# Patient Record
Sex: FEMALE | Race: WHITE | NOT HISPANIC OR LATINO | Employment: STUDENT | URBAN - METROPOLITAN AREA
[De-identification: names, ages, dates, MRNs, and addresses within clinical notes are randomized per-mention and may not be internally consistent; named-entity substitution may affect disease eponyms.]

---
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
## 2023-01-03 ENCOUNTER — LAB REQUISITION (OUTPATIENT)
Dept: LAB | Facility: CLINIC | Age: 18
End: 2023-01-03
Payer: COMMERCIAL

## 2023-01-03 PROCEDURE — 88304 TISSUE EXAM BY PATHOLOGIST: CPT | Mod: TC,ORL | Performed by: OTOLARYNGOLOGY

## 2023-01-04 LAB
PATH REPORT.COMMENTS IMP SPEC: NORMAL
PATH REPORT.COMMENTS IMP SPEC: NORMAL
PATH REPORT.FINAL DX SPEC: NORMAL
PATH REPORT.GROSS SPEC: NORMAL
PATH REPORT.MICROSCOPIC SPEC OTHER STN: NORMAL
PATH REPORT.RELEVANT HX SPEC: NORMAL
PHOTO IMAGE: NORMAL

## 2023-01-04 PROCEDURE — 88304 TISSUE EXAM BY PATHOLOGIST: CPT | Mod: 26 | Performed by: PATHOLOGY

## 2023-01-09 ENCOUNTER — DOCUMENTATION ONLY (OUTPATIENT)
Dept: OTHER | Facility: CLINIC | Age: 18
End: 2023-01-09

## 2023-04-19 ENCOUNTER — TELEPHONE (OUTPATIENT)
Dept: FAMILY MEDICINE | Facility: CLINIC | Age: 18
End: 2023-04-19
Payer: COMMERCIAL

## 2023-04-19 NOTE — TELEPHONE ENCOUNTER
[facility] in [facility] called trying to refill patients birth control.    New pharmacy will not pull into [facility] for refill. RN asked if pharmacists could send us a electronic request so that we could try and enter that into the system.      ARIA Jay  [facility]

## 2023-05-07 ENCOUNTER — HEALTH MAINTENANCE LETTER (OUTPATIENT)
Age: 18
End: 2023-05-07

## 2023-06-02 ENCOUNTER — OFFICE VISIT (OUTPATIENT)
Dept: FAMILY MEDICINE | Facility: CLINIC | Age: 18
End: 2023-06-02
Payer: COMMERCIAL

## 2023-06-02 VITALS
HEART RATE: 72 BPM | DIASTOLIC BLOOD PRESSURE: 69 MMHG | SYSTOLIC BLOOD PRESSURE: 115 MMHG | OXYGEN SATURATION: 100 % | BODY MASS INDEX: 19.72 KG/M2 | WEIGHT: 122.7 LBS | RESPIRATION RATE: 16 BRPM | HEIGHT: 66 IN

## 2023-06-02 DIAGNOSIS — Z13.220 SCREENING, LIPID: ICD-10-CM

## 2023-06-02 DIAGNOSIS — Z51.81 ENCOUNTER FOR THERAPEUTIC DRUG MONITORING: ICD-10-CM

## 2023-06-02 DIAGNOSIS — R11.15 NON-INTRACTABLE CYCLICAL VOMITING WITH NAUSEA: ICD-10-CM

## 2023-06-02 DIAGNOSIS — Z30.09 GENERAL COUNSELING FOR PRESCRIPTION OF ORAL CONTRACEPTIVES: ICD-10-CM

## 2023-06-02 DIAGNOSIS — R63.4 WEIGHT LOSS: ICD-10-CM

## 2023-06-02 DIAGNOSIS — Z00.00 ENCOUNTER FOR PREVENTIVE CARE: ICD-10-CM

## 2023-06-02 DIAGNOSIS — J45.20 MILD INTERMITTENT ASTHMA WITHOUT COMPLICATION: ICD-10-CM

## 2023-06-02 DIAGNOSIS — Z11.3 SCREENING FOR STDS (SEXUALLY TRANSMITTED DISEASES): Primary | ICD-10-CM

## 2023-06-02 DIAGNOSIS — J45.990 EXERCISE-INDUCED ASTHMA: ICD-10-CM

## 2023-06-02 PROBLEM — J45.909 MILD ASTHMA: Status: ACTIVE | Noted: 2018-06-26

## 2023-06-02 PROBLEM — J30.9 ALLERGIC RHINITIS DUE TO ALLERGEN: Status: RESOLVED | Noted: 2017-07-07 | Resolved: 2023-06-02

## 2023-06-02 PROBLEM — J35.1 SWOLLEN TONSIL: Status: RESOLVED | Noted: 2022-12-27 | Resolved: 2023-06-02

## 2023-06-02 PROCEDURE — 99395 PREV VISIT EST AGE 18-39: CPT | Performed by: FAMILY MEDICINE

## 2023-06-02 PROCEDURE — 99213 OFFICE O/P EST LOW 20 MIN: CPT | Mod: 25 | Performed by: FAMILY MEDICINE

## 2023-06-02 RX ORDER — NORGESTIMATE AND ETHINYL ESTRADIOL 0.25-0.035
1 KIT ORAL DAILY
Qty: 84 TABLET | Refills: 4 | Status: SHIPPED | OUTPATIENT
Start: 2023-06-02 | End: 2023-08-02

## 2023-06-02 RX ORDER — MONTELUKAST SODIUM 10 MG/1
10 TABLET ORAL AT BEDTIME
Qty: 90 TABLET | Refills: 3 | Status: SHIPPED | OUTPATIENT
Start: 2023-06-02 | End: 2024-06-11

## 2023-06-02 RX ORDER — ALBUTEROL SULFATE 90 UG/1
2 AEROSOL, METERED RESPIRATORY (INHALATION) EVERY 4 HOURS PRN
Qty: 18 G | Refills: 3 | Status: SHIPPED | OUTPATIENT
Start: 2023-06-02 | End: 2024-08-13

## 2023-06-02 RX ORDER — ONDANSETRON 4 MG/1
TABLET, ORALLY DISINTEGRATING ORAL
Qty: 20 TABLET | Refills: 3 | Status: SHIPPED | OUTPATIENT
Start: 2023-06-02 | End: 2024-08-13

## 2023-06-02 ASSESSMENT — ENCOUNTER SYMPTOMS
WEAKNESS: 0
DIZZINESS: 0
ARTHRALGIAS: 0
HEADACHES: 0
CONSTIPATION: 0
DIARRHEA: 0
PALPITATIONS: 0
NAUSEA: 0
COUGH: 0
HEMATOCHEZIA: 0
HEARTBURN: 0
EYE PAIN: 0
CHILLS: 0
DYSURIA: 0
FEVER: 0
JOINT SWELLING: 0
MYALGIAS: 0
ABDOMINAL PAIN: 0
FREQUENCY: 0
NERVOUS/ANXIOUS: 0
BREAST MASS: 0
HEMATURIA: 0
PARESTHESIAS: 0
SORE THROAT: 0
SHORTNESS OF BREATH: 0

## 2023-06-02 NOTE — ASSESSMENT & PLAN NOTE
Current bmi 19.96. weight has increased recently so will resolve this. She says she was running so that helped reduce her weight.

## 2023-06-02 NOTE — ASSESSMENT & PLAN NOTE
Use of oral contraceptive pills-she uses Heather and I will refill that today as it is working well for her.

## 2023-06-02 NOTE — ASSESSMENT & PLAN NOTE
Contraception - takes Heather.   Chart indicates she is due for her third COVID-vaccine   Pap: No indication until age 21  Mammo:  There is no family or personal history, not indicated    Colonoscopy:  There is no family or personal history, not indicated     Std testing desired:  offered  Osteoporosis prevention discussed.  vitamin d levels ordered. Recommend daily calcium and vitamin d intake to keep good bone health. Recommend weight bearing exercise, no tobacco, and limit alcohol  dexa - no indication.  Recommend sunscreen, exercise, & healthy diet.  Offered cbc, cmp, lipids and asked what other testing she  desires today  I have had an Advance Directives discussion with the patient.   There is no height or weight on file to calculate BMI.   mychart offered.

## 2023-06-02 NOTE — PROGRESS NOTES
Medication management addressed above and beyond usual scope of annual exam.      SUBJECTIVE:   CC: Letty is an 18 year old who presents for preventive health visit.       6/2/2023    12:55 PM   Additional Questions   Roomed by Tor Pereira MA   Accompanied by Self         6/2/2023    12:55 PM   Patient Reported Additional Medications   Patient reports taking the following new medications None     Healthy Habits:     Getting at least 3 servings of Calcium per day:  Yes    Bi-annual eye exam:  Yes    Dental care twice a year:  Yes    Sleep apnea or symptoms of sleep apnea:  None    Diet:  Regular (no restrictions)    Frequency of exercise:  4-5 days/week    Duration of exercise:  15-30 minutes    Taking medications regularly:  Yes    Medication side effects:  None    PHQ-2 Total Score: 0    Additional concerns today:  No      Today's PHQ-2 Score:       6/2/2023    12:48 PM   PHQ-2 ( 1999 [employer])   Q1: Little interest or pleasure in doing things 0   Q2: Feeling down, depressed or hopeless 0   PHQ-2 Score 0   Q1: Little interest or pleasure in doing things Not at all   Q2: Feeling down, depressed or hopeless Not at all   PHQ-2 Score 0           Social History     Tobacco Use     Smoking status: Never     Passive exposure: Never     Smokeless tobacco: Never   Vaping Use     Vaping status: Never Used     Passive vaping exposure: Yes   Substance Use Topics     Alcohol use: Not on file           6/2/2023    12:48 PM   Alcohol Use   Prescreen: >3 drinks/day or >7 drinks/week? No     Reviewed orders with patient.  Reviewed health maintenance and updated orders accordingly - Yes    Breast Cancer Screening:    History of abnormal Pap smear: NO - under age 21, PAP not appropriate for age     Reviewed and updated as needed this visit by clinical staff   Tobacco  Allergies  Meds  Problems  Med Hx  Surg Hx  Fam Hx  Soc   Hx        Reviewed and updated as needed this visit by Provider   Tobacco  Allergies  Meds  Problems   "Med Hx  Surg Hx  Fam Hx  Soc   Hx         Review of Systems   Constitutional: Negative for chills and fever.   HENT: Negative for congestion, ear pain, hearing loss and sore throat.    Eyes: Negative for pain and visual disturbance.   Respiratory: Negative for cough and shortness of breath.    Cardiovascular: Negative for chest pain, palpitations and peripheral edema.   Gastrointestinal: Negative for abdominal pain, constipation, diarrhea, heartburn, hematochezia and nausea.   Breasts:  Negative for tenderness, breast mass and discharge.   Genitourinary: Negative for dysuria, frequency, genital sores, hematuria, pelvic pain, urgency, vaginal bleeding and vaginal discharge.   Musculoskeletal: Negative for arthralgias, joint swelling and myalgias.   Skin: Negative for rash.   Neurological: Negative for dizziness, weakness, headaches and paresthesias.   Psychiatric/Behavioral: Negative for mood changes. The patient is not nervous/anxious.         OBJECTIVE:   /69 (BP Location: Left arm, Patient Position: Sitting, Cuff Size: Adult Regular)   Pulse 72   Resp 16   Ht 1.67 m (5' 5.75\")   Wt 55.7 kg (122 lb 11.2 oz)   LMP 05/25/2023   SpO2 100%   BMI 19.96 kg/m    Physical Exam  Constitutional:       Appearance: Normal appearance.   HENT:      Head: Normocephalic and atraumatic.      Right Ear: Tympanic membrane, ear canal and external ear normal.      Left Ear: Tympanic membrane, ear canal and external ear normal.      Nose: Nose normal.      Mouth/Throat:      Mouth: Mucous membranes are moist.      Pharynx: Oropharynx is clear.   Eyes:      Conjunctiva/sclera: Conjunctivae normal.      Pupils: Pupils are equal, round, and reactive to light.   Cardiovascular:      Rate and Rhythm: Normal rate and regular rhythm.      Heart sounds: Normal heart sounds.   Pulmonary:      Effort: Pulmonary effort is normal.      Breath sounds: Normal breath sounds.   Abdominal:      General: Bowel sounds are normal.      " Palpations: Abdomen is soft.   Musculoskeletal:         General: Normal range of motion.      Cervical back: Normal range of motion and neck supple.   Skin:     General: Skin is warm and dry.      Capillary Refill: Capillary refill takes less than 2 seconds.   Neurological:      General: No focal deficit present.      Mental Status: She is alert and oriented to person, place, and time.   Psychiatric:         Mood and Affect: Mood normal.         Behavior: Behavior normal.         Thought Content: Thought content normal.         Judgment: Judgment normal.           ASSESSMENT/PLAN:     Problem List Items Addressed This Visit        Respiratory    Mild asthma     Asthma   Triggers: allergies at the end of winter and allegra helps, but still needs albuterol daily so will add singulair to see if that reduces her need for albuterol.   She also is to take it prior to exercise.         Relevant Medications    albuterol (PROAIR HFA/PROVENTIL HFA/VENTOLIN HFA) 108 (90 Base) MCG/ACT inhaler    montelukast (SINGULAIR) 10 MG tablet       Digestive    Non-intractable cyclical vomiting with nausea     Cyclic vomiting syndrome - Menstrual related.  Takes Zofran mid-cycle.          Relevant Medications    ondansetron (ZOFRAN ODT) 4 MG ODT tab    norgestimate-ethinyl estradiol (LEISA) 0.25-35 MG-MCG tablet       Other    Encounter for preventive care     Contraception - takes Leisa.   Chart indicates she is due for her third COVID-vaccine   Pap: No indication until age 21  Mammo:  There is no family or personal history, not indicated    Colonoscopy:  There is no family or personal history, not indicated     Std testing desired:  offered  Osteoporosis prevention discussed.  vitamin d levels ordered. Recommend daily calcium and vitamin d intake to keep good bone health. Recommend weight bearing exercise, no tobacco, and limit alcohol  dexa - no indication.  Recommend sunscreen, exercise, & healthy diet.  Offered cbc, cmp, lipids and  asked what other testing she  desires today  I have had an Advance Directives discussion with the patient.   There is no height or weight on file to calculate BMI.   debby offered.            General counseling for prescription of oral contraceptives     Use of oral contraceptive pills-she uses [facility] and I will refill that today as it is working well for her.         RESOLVED: Weight loss     Current bmi 19.96. weight has increased recently so will resolve this. She says she was running so that helped reduce her weight.         Other Visit Diagnoses     Screening for STDs (sexually transmitted diseases)    -  Primary    Encounter for therapeutic drug monitoring        Relevant Orders    CBC with platelets    Comprehensive metabolic panel (BMP + Alb, Alk Phos, ALT, AST, Total. Bili, TP)    Screening, lipid        Relevant Orders    Lipid Profile          Patient has been advised of split billing requirements and indicates understanding: Yes      COUNSELING:  Reviewed preventive health counseling, as reflected in patient instructions       Regular exercise       Healthy diet/nutrition       Contraception       Safe sex practices/STD prevention       Advance Care Planning        She reports that she has never smoked. She has never been exposed to tobacco smoke. She has never used smokeless tobacco.      Mercy Romero MD  [facility] [place]

## 2023-06-02 NOTE — ASSESSMENT & PLAN NOTE
Asthma   Triggers: allergies at the end of winter and allegra helps, but still needs albuterol daily so will add singulair to see if that reduces her need for albuterol.   She also is to take it prior to exercise.

## 2023-08-02 RX ORDER — NORGESTIMATE AND ETHINYL ESTRADIOL 0.25-0.035
1 KIT ORAL DAILY
Qty: 84 TABLET | Refills: 4 | Status: SHIPPED | OUTPATIENT
Start: 2023-08-02 | End: 2024-08-13

## 2023-08-21 ENCOUNTER — MYC MEDICAL ADVICE (OUTPATIENT)
Dept: FAMILY MEDICINE | Facility: CLINIC | Age: 18
End: 2023-08-21
Payer: COMMERCIAL

## 2023-08-21 DIAGNOSIS — Z01.84 IMMUNITY STATUS TESTING: Primary | ICD-10-CM

## 2023-08-23 ENCOUNTER — LAB (OUTPATIENT)
Dept: LAB | Facility: CLINIC | Age: 18
End: 2023-08-23
Payer: COMMERCIAL

## 2023-08-23 DIAGNOSIS — Z01.84 IMMUNITY STATUS TESTING: ICD-10-CM

## 2023-08-23 DIAGNOSIS — Z51.81 ENCOUNTER FOR THERAPEUTIC DRUG MONITORING: ICD-10-CM

## 2023-08-23 DIAGNOSIS — Z13.220 SCREENING, LIPID: ICD-10-CM

## 2023-08-23 LAB
ALBUMIN SERPL BCG-MCNC: 4.4 G/DL (ref 3.5–5.2)
ALP SERPL-CCNC: 89 U/L (ref 35–104)
ALT SERPL W P-5'-P-CCNC: 10 U/L (ref 0–50)
ANION GAP SERPL CALCULATED.3IONS-SCNC: 11 MMOL/L (ref 7–15)
AST SERPL W P-5'-P-CCNC: 19 U/L (ref 0–45)
BILIRUB SERPL-MCNC: 0.3 MG/DL
BUN SERPL-MCNC: 11.4 MG/DL (ref 6–20)
CALCIUM SERPL-MCNC: 9.1 MG/DL (ref 8.6–10)
CHLORIDE SERPL-SCNC: 105 MMOL/L (ref 98–107)
CHOLEST SERPL-MCNC: 183 MG/DL
CREAT SERPL-MCNC: 0.8 MG/DL (ref 0.51–0.95)
DEPRECATED HCO3 PLAS-SCNC: 25 MMOL/L (ref 22–29)
ERYTHROCYTE [DISTWIDTH] IN BLOOD BY AUTOMATED COUNT: 14.8 % (ref 10–15)
GFR SERPL CREATININE-BSD FRML MDRD: >90 ML/MIN/1.73M2
GLUCOSE SERPL-MCNC: 90 MG/DL (ref 70–99)
HCT VFR BLD AUTO: 40.3 % (ref 35–47)
HDLC SERPL-MCNC: 56 MG/DL
HGB BLD-MCNC: 12.8 G/DL (ref 11.7–15.7)
LDLC SERPL CALC-MCNC: 102 MG/DL
MCH RBC QN AUTO: 25.4 PG (ref 26.5–33)
MCHC RBC AUTO-ENTMCNC: 31.8 G/DL (ref 31.5–36.5)
MCV RBC AUTO: 80 FL (ref 78–100)
NONHDLC SERPL-MCNC: 127 MG/DL
PLATELET # BLD AUTO: 303 10E3/UL (ref 150–450)
POTASSIUM SERPL-SCNC: 4.4 MMOL/L (ref 3.4–5.3)
PROT SERPL-MCNC: 7.6 G/DL (ref 6.4–8.3)
RBC # BLD AUTO: 5.04 10E6/UL (ref 3.8–5.2)
SODIUM SERPL-SCNC: 141 MMOL/L (ref 136–145)
TRIGL SERPL-MCNC: 125 MG/DL
WBC # BLD AUTO: 5.5 10E3/UL (ref 4–11)

## 2023-08-23 PROCEDURE — 86706 HEP B SURFACE ANTIBODY: CPT

## 2023-08-23 PROCEDURE — 80053 COMPREHEN METABOLIC PANEL: CPT

## 2023-08-23 PROCEDURE — 36415 COLL VENOUS BLD VENIPUNCTURE: CPT

## 2023-08-23 PROCEDURE — 85027 COMPLETE CBC AUTOMATED: CPT

## 2023-08-23 PROCEDURE — 80061 LIPID PANEL: CPT

## 2023-08-24 LAB
HBV SURFACE AB SERPL IA-ACNC: 454.32 M[IU]/ML
HBV SURFACE AB SERPL IA-ACNC: REACTIVE M[IU]/ML

## 2023-08-25 ENCOUNTER — MYC MEDICAL ADVICE (OUTPATIENT)
Dept: FAMILY MEDICINE | Facility: CLINIC | Age: 18
End: 2023-08-25
Payer: COMMERCIAL

## 2023-11-08 ENCOUNTER — E-VISIT (OUTPATIENT)
Dept: FAMILY MEDICINE | Facility: CLINIC | Age: 18
End: 2023-11-08
Payer: COMMERCIAL

## 2023-11-08 DIAGNOSIS — N39.0 ACUTE UTI (URINARY TRACT INFECTION): Primary | ICD-10-CM

## 2023-11-08 PROCEDURE — 99421 OL DIG E/M SVC 5-10 MIN: CPT | Performed by: FAMILY MEDICINE

## 2023-11-08 RX ORDER — NITROFURANTOIN 25; 75 MG/1; MG/1
100 CAPSULE ORAL 2 TIMES DAILY
Qty: 10 CAPSULE | Refills: 0 | Status: SHIPPED | OUTPATIENT
Start: 2023-11-08 | End: 2023-11-13

## 2023-11-08 NOTE — PATIENT INSTRUCTIONS
Dear Letty Agarwal    After reviewing your responses, I suspecte you have a urinary tract infection, which is a common infection of the bladder with bacteria.  This is not a sexually transmitted infection, though urinating immediately after intercourse can help prevent infections.  Drinking lots of fluids is also helpful to clear your current infection and prevent the next one.      I have sent a prescription for antibiotics to your pharmacy to treat this infection.        It is important that you take all of your prescribed medication even if your symptoms are improving after a few doses.  Taking all of your medicine helps prevent the symptoms from returning.     If you are not getting better with the antibiotic, more testing would be recommended and an in person visit to leave urine sample/ check for stds    If your symptoms worsen, you develop pain in your back or stomach, develop fevers, or are not improving in 5 days, please contact your primary care provider for an appointment or visit any of our convenient Walk-in or Urgent Care Centers to be seen, which can be found on our website here.    Thanks again for choosing us as your health care partner,    Mercy Romero MD

## 2023-12-27 DIAGNOSIS — E78.00 ELEVATED LDL CHOLESTEROL LEVEL: Primary | ICD-10-CM

## 2023-12-29 ENCOUNTER — LAB (OUTPATIENT)
Dept: LAB | Facility: CLINIC | Age: 18
End: 2023-12-29
Payer: COMMERCIAL

## 2023-12-29 DIAGNOSIS — E78.00 ELEVATED LDL CHOLESTEROL LEVEL: ICD-10-CM

## 2023-12-29 LAB
CHOLEST SERPL-MCNC: 184 MG/DL
FASTING STATUS PATIENT QL REPORTED: YES
HDLC SERPL-MCNC: 62 MG/DL
LDLC SERPL CALC-MCNC: 100 MG/DL
NONHDLC SERPL-MCNC: 122 MG/DL
TRIGL SERPL-MCNC: 109 MG/DL

## 2023-12-29 PROCEDURE — 80061 LIPID PANEL: CPT

## 2023-12-29 PROCEDURE — 36415 COLL VENOUS BLD VENIPUNCTURE: CPT

## 2024-05-03 ENCOUNTER — PATIENT OUTREACH (OUTPATIENT)
Dept: CARE COORDINATION | Facility: CLINIC | Age: 19
End: 2024-05-03
Payer: COMMERCIAL

## 2024-05-17 ENCOUNTER — PATIENT OUTREACH (OUTPATIENT)
Dept: CARE COORDINATION | Facility: CLINIC | Age: 19
End: 2024-05-17
Payer: COMMERCIAL

## 2024-06-09 DIAGNOSIS — J45.20 MILD INTERMITTENT ASTHMA WITHOUT COMPLICATION: ICD-10-CM

## 2024-06-10 ENCOUNTER — DOCUMENTATION ONLY (OUTPATIENT)
Dept: LAB | Facility: CLINIC | Age: 19
End: 2024-06-10
Payer: COMMERCIAL

## 2024-06-10 DIAGNOSIS — Z13.220 SCREENING, LIPID: ICD-10-CM

## 2024-06-10 DIAGNOSIS — Z13.228 SCREENING FOR METABOLIC DISORDER: Primary | ICD-10-CM

## 2024-06-10 DIAGNOSIS — Z13.0 SCREENING, ANEMIA, DEFICIENCY, IRON: ICD-10-CM

## 2024-06-10 NOTE — PROGRESS NOTES
Letty Agarwal has an upcoming lab appointment:    Future Appointments   Date Time Provider Department Center   6/17/2024  8:45 AM [facility]   8/13/2024  3:20 PM Mercy Romero MD [facility]     Patient is scheduled for the following lab(s):     There is no order available. Please review and place either future orders or HMPO (Review of Health Maintenance Protocol Orders), as appropriate.    Health Maintenance Due   Topic    ANNUAL REVIEW OF HM ORDERS      Sergio Aleman

## 2024-06-11 RX ORDER — MONTELUKAST SODIUM 10 MG/1
1 TABLET ORAL AT BEDTIME
Qty: 30 TABLET | Refills: 86 | Status: SHIPPED | OUTPATIENT
Start: 2024-06-11 | End: 2024-08-13

## 2024-06-17 ENCOUNTER — LAB (OUTPATIENT)
Dept: LAB | Facility: CLINIC | Age: 19
End: 2024-06-17
Payer: COMMERCIAL

## 2024-06-17 DIAGNOSIS — Z13.228 SCREENING FOR METABOLIC DISORDER: ICD-10-CM

## 2024-06-17 DIAGNOSIS — Z11.1 SCREENING FOR TUBERCULOSIS: ICD-10-CM

## 2024-06-17 DIAGNOSIS — Z13.220 SCREENING, LIPID: ICD-10-CM

## 2024-06-17 DIAGNOSIS — Z11.1 SCREENING FOR TUBERCULOSIS: Primary | ICD-10-CM

## 2024-06-17 DIAGNOSIS — Z13.0 SCREENING, ANEMIA, DEFICIENCY, IRON: ICD-10-CM

## 2024-06-17 LAB
ALBUMIN SERPL BCG-MCNC: 4.4 G/DL (ref 3.5–5.2)
ALP SERPL-CCNC: 72 U/L (ref 40–150)
ALT SERPL W P-5'-P-CCNC: 13 U/L (ref 0–50)
ANION GAP SERPL CALCULATED.3IONS-SCNC: 10 MMOL/L (ref 7–15)
AST SERPL W P-5'-P-CCNC: 20 U/L (ref 0–45)
BILIRUB SERPL-MCNC: 0.4 MG/DL
BUN SERPL-MCNC: 10.2 MG/DL (ref 6–20)
CALCIUM SERPL-MCNC: 9.2 MG/DL (ref 8.6–10)
CHLORIDE SERPL-SCNC: 103 MMOL/L (ref 98–107)
CHOLEST SERPL-MCNC: 168 MG/DL
CREAT SERPL-MCNC: 0.8 MG/DL (ref 0.51–0.95)
DEPRECATED HCO3 PLAS-SCNC: 25 MMOL/L (ref 22–29)
EGFRCR SERPLBLD CKD-EPI 2021: >90 ML/MIN/1.73M2
ERYTHROCYTE [DISTWIDTH] IN BLOOD BY AUTOMATED COUNT: 14.3 % (ref 10–15)
FASTING STATUS PATIENT QL REPORTED: NO
FASTING STATUS PATIENT QL REPORTED: NO
GLUCOSE SERPL-MCNC: 94 MG/DL (ref 70–99)
HCT VFR BLD AUTO: 40.6 % (ref 35–47)
HDLC SERPL-MCNC: 56 MG/DL
HGB BLD-MCNC: 12.8 G/DL (ref 11.7–15.7)
LDLC SERPL CALC-MCNC: 91 MG/DL
MCH RBC QN AUTO: 28.8 PG (ref 26.5–33)
MCHC RBC AUTO-ENTMCNC: 31.5 G/DL (ref 31.5–36.5)
MCV RBC AUTO: 91 FL (ref 78–100)
NONHDLC SERPL-MCNC: 112 MG/DL
PLATELET # BLD AUTO: 175 10E3/UL (ref 150–450)
POTASSIUM SERPL-SCNC: 4.3 MMOL/L (ref 3.4–5.3)
PROT SERPL-MCNC: 7.3 G/DL (ref 6.4–8.3)
RBC # BLD AUTO: 4.44 10E6/UL (ref 3.8–5.2)
SODIUM SERPL-SCNC: 138 MMOL/L (ref 135–145)
TRIGL SERPL-MCNC: 107 MG/DL
WBC # BLD AUTO: 4.7 10E3/UL (ref 4–11)

## 2024-06-17 PROCEDURE — 80061 LIPID PANEL: CPT

## 2024-06-17 PROCEDURE — 36415 COLL VENOUS BLD VENIPUNCTURE: CPT

## 2024-06-17 PROCEDURE — 86481 TB AG RESPONSE T-CELL SUSP: CPT

## 2024-06-17 PROCEDURE — 85027 COMPLETE CBC AUTOMATED: CPT

## 2024-06-17 PROCEDURE — 80053 COMPREHEN METABOLIC PANEL: CPT

## 2024-06-18 LAB
GAMMA INTERFERON BACKGROUND BLD IA-ACNC: 0.09 IU/ML
M TB IFN-G BLD-IMP: NEGATIVE
M TB IFN-G CD4+ BCKGRND COR BLD-ACNC: 9.91 IU/ML
MITOGEN IGNF BCKGRD COR BLD-ACNC: -0.01 IU/ML
MITOGEN IGNF BCKGRD COR BLD-ACNC: 0 IU/ML
QUANTIFERON MITOGEN: 10 IU/ML
QUANTIFERON NIL TUBE: 0.09 IU/ML
QUANTIFERON TB1 TUBE: 0.09 IU/ML
QUANTIFERON TB2 TUBE: 0.08

## 2024-07-14 ENCOUNTER — HEALTH MAINTENANCE LETTER (OUTPATIENT)
Age: 19
End: 2024-07-14

## 2024-08-13 ENCOUNTER — OFFICE VISIT (OUTPATIENT)
Dept: FAMILY MEDICINE | Facility: CLINIC | Age: 19
End: 2024-08-13
Payer: COMMERCIAL

## 2024-08-13 VITALS
DIASTOLIC BLOOD PRESSURE: 73 MMHG | WEIGHT: 125 LBS | TEMPERATURE: 97.8 F | HEIGHT: 66 IN | HEART RATE: 47 BPM | SYSTOLIC BLOOD PRESSURE: 112 MMHG | OXYGEN SATURATION: 98 % | RESPIRATION RATE: 14 BRPM | BODY MASS INDEX: 20.09 KG/M2

## 2024-08-13 DIAGNOSIS — J45.990 EXERCISE-INDUCED ASTHMA: ICD-10-CM

## 2024-08-13 DIAGNOSIS — B07.0 PLANTAR WARTS: ICD-10-CM

## 2024-08-13 DIAGNOSIS — Z12.4 CERVICAL CANCER SCREENING: ICD-10-CM

## 2024-08-13 DIAGNOSIS — J45.20 MILD INTERMITTENT ASTHMA WITHOUT COMPLICATION: ICD-10-CM

## 2024-08-13 DIAGNOSIS — Z30.09 GENERAL COUNSELING FOR PRESCRIPTION OF ORAL CONTRACEPTIVES: ICD-10-CM

## 2024-08-13 DIAGNOSIS — Z00.00 ROUTINE GENERAL MEDICAL EXAMINATION AT A HEALTH CARE FACILITY: ICD-10-CM

## 2024-08-13 DIAGNOSIS — R11.15 NON-INTRACTABLE CYCLICAL VOMITING WITH NAUSEA: ICD-10-CM

## 2024-08-13 DIAGNOSIS — Z00.00 HEALTH CARE MAINTENANCE: Primary | ICD-10-CM

## 2024-08-13 PROCEDURE — 90677 PCV20 VACCINE IM: CPT | Performed by: FAMILY MEDICINE

## 2024-08-13 PROCEDURE — 90471 IMMUNIZATION ADMIN: CPT | Performed by: FAMILY MEDICINE

## 2024-08-13 PROCEDURE — 99213 OFFICE O/P EST LOW 20 MIN: CPT | Mod: 25 | Performed by: FAMILY MEDICINE

## 2024-08-13 PROCEDURE — 99395 PREV VISIT EST AGE 18-39: CPT | Mod: 25 | Performed by: FAMILY MEDICINE

## 2024-08-13 RX ORDER — ONDANSETRON 4 MG/1
TABLET, ORALLY DISINTEGRATING ORAL
Qty: 20 TABLET | Refills: 3 | Status: SHIPPED | OUTPATIENT
Start: 2024-08-13

## 2024-08-13 RX ORDER — MONTELUKAST SODIUM 10 MG/1
1 TABLET ORAL AT BEDTIME
Qty: 30 TABLET | Refills: 86 | Status: SHIPPED | OUTPATIENT
Start: 2024-08-13

## 2024-08-13 RX ORDER — NORGESTIMATE AND ETHINYL ESTRADIOL 0.25-0.035
1 KIT ORAL DAILY
Qty: 84 TABLET | Refills: 4 | Status: SHIPPED | OUTPATIENT
Start: 2024-08-13

## 2024-08-13 RX ORDER — ALBUTEROL SULFATE 90 UG/1
2 AEROSOL, METERED RESPIRATORY (INHALATION) EVERY 4 HOURS PRN
Qty: 18 G | Refills: 3 | Status: SHIPPED | OUTPATIENT
Start: 2024-08-13

## 2024-08-13 SDOH — HEALTH STABILITY: PHYSICAL HEALTH: ON AVERAGE, HOW MANY DAYS PER WEEK DO YOU ENGAGE IN MODERATE TO STRENUOUS EXERCISE (LIKE A BRISK WALK)?: 6 DAYS

## 2024-08-13 ASSESSMENT — SOCIAL DETERMINANTS OF HEALTH (SDOH): HOW OFTEN DO YOU GET TOGETHER WITH FRIENDS OR RELATIVES?: THREE TIMES A WEEK

## 2024-08-13 ASSESSMENT — ASTHMA QUESTIONNAIRES
QUESTION_1 LAST FOUR WEEKS HOW MUCH OF THE TIME DID YOUR ASTHMA KEEP YOU FROM GETTING AS MUCH DONE AT WORK, SCHOOL OR AT HOME: NONE OF THE TIME
QUESTION_2 LAST FOUR WEEKS HOW OFTEN HAVE YOU HAD SHORTNESS OF BREATH: ONCE OR TWICE A WEEK
QUESTION_5 LAST FOUR WEEKS HOW WOULD YOU RATE YOUR ASTHMA CONTROL: COMPLETELY CONTROLLED
ACT_TOTALSCORE: 22
QUESTION_4 LAST FOUR WEEKS HOW OFTEN HAVE YOU USED YOUR RESCUE INHALER OR NEBULIZER MEDICATION (SUCH AS ALBUTEROL): TWO OR THREE TIMES PER WEEK
QUESTION_3 LAST FOUR WEEKS HOW OFTEN DID YOUR ASTHMA SYMPTOMS (WHEEZING, COUGHING, SHORTNESS OF BREATH, CHEST TIGHTNESS OR PAIN) WAKE YOU UP AT NIGHT OR EARLIER THAN USUAL IN THE MORNING: NOT AT ALL
ACT_TOTALSCORE: 22

## 2024-08-13 NOTE — ASSESSMENT & PLAN NOTE
Asthma   Triggers: allergies at the end of winter and allegra helps, but still needs albuterol daily so will add singulair to see if that reduces her need for albuterol.   She also is to take it prior to exercise.    Refilled albuterol and monteleukast

## 2024-08-13 NOTE — ASSESSMENT & PLAN NOTE
Lateral left plantar surface distally there appear to be a cluster of small warts that are causing her discomfort when running. Discussed different options for treatment, she wants to avoid any potentially painful treatment until the end of September when she plans to run a marathon, in the meantime she will cover with duct tape which can help to alleviate warts.     We discussed that she can get a cushion insert for running shoes and cut out the corresponding area to allow less pounding on the area of concern when she is running.

## 2024-08-13 NOTE — ASSESSMENT & PLAN NOTE
Contraception - saul ocp.   Pneumo vax: done today. Covid not available right now.   Pap: declined as she has her period today.   Mammo: mom with breast cancer at age 38, so we discussed at age 26 to start mammograms.   Colonoscopy:  There is no family or personal history, not indicated     Std testing desired:  offered  Osteoporosis prevention discussed.  vitamin d levels ordered. Recommend daily calcium and vitamin d intake to keep good bone health. Recommend weight bearing exercise, no tobacco, and limit alcohol  dexa - no indication.   Recommend sunscreen, exercise, & healthy diet.  Offered cbc, cmp, lipids and asked what other testing she  desires today  I have had an Advance Directives discussion with the patient.   Body mass index is 20.48 kg/m .   mychart active.

## 2024-08-13 NOTE — PATIENT INSTRUCTIONS
Patient Education   Preventive Care Advice   This is general advice given by our system to help you stay healthy. However, your care team may have specific advice just for you. Please talk to your care team about your preventive care needs.  Nutrition  Eat 5 or more servings of fruits and vegetables each day.  Try wheat bread, brown rice and whole grain pasta (instead of white bread, rice, and pasta).  Get enough calcium and vitamin D. Check the label on foods and aim for 100% of the RDA (recommended daily allowance).  Lifestyle  Exercise at least 150 minutes each week  (30 minutes a day, 5 days a week).  Do muscle strengthening activities 2 days a week. These help control your weight and prevent disease.  No smoking.  Wear sunscreen to prevent skin cancer.  Have a dental exam and cleaning every 6 months.  Yearly exams  See your health care team every year to talk about:  Any changes in your health.  Any medicines your care team has prescribed.  Preventive care, family planning, and ways to prevent chronic diseases.  Shots (vaccines)   HPV shots (up to age 26), if you've never had them before.  Hepatitis B shots (up to age 59), if you've never had them before.  COVID-19 shot: Get this shot when it's due.  Flu shot: Get a flu shot every year.  Tetanus shot: Get a tetanus shot every 10 years.  Pneumococcal, hepatitis A, and RSV shots: Ask your care team if you need these based on your risk.  Shingles shot (for age 50 and up)  General health tests  Diabetes screening:  Starting at age 35, Get screened for diabetes at least every 3 years.  If you are younger than age 35, ask your care team if you should be screened for diabetes.  Cholesterol test: At age 39, start having a cholesterol test every 5 years, or more often if advised.  Bone density scan (DEXA): At age 50, ask your care team if you should have this scan for osteoporosis (brittle bones).  Hepatitis C: Get tested at least once in your life.  STIs (sexually  transmitted infections)  Before age 24: Ask your care team if you should be screened for STIs.  After age 24: Get screened for STIs if you're at risk. You are at risk for STIs (including HIV) if:  You are sexually active with more than one person.  You don't use condoms every time.  You or a partner was diagnosed with a sexually transmitted infection.  If you are at risk for HIV, ask about PrEP medicine to prevent HIV.  Get tested for HIV at least once in your life, whether you are at risk for HIV or not.  Cancer screening tests  Cervical cancer screening: If you have a cervix, begin getting regular cervical cancer screening tests starting at age 21.  Breast cancer scan (mammogram): If you've ever had breasts, begin having regular mammograms starting at age 40. This is a scan to check for breast cancer.  Colon cancer screening: It is important to start screening for colon cancer at age 45.  Have a colonoscopy test every 10 years (or more often if you're at risk) Or, ask your provider about stool tests like a FIT test every year or Cologuard test every 3 years.  To learn more about your testing options, visit:   .  For help making a decision, visit:   https://bit.ly/sh93940.  Prostate cancer screening test: If you have a prostate, ask your care team if a prostate cancer screening test (PSA) at age 55 is right for you.  Lung cancer screening: If you are a current or former smoker ages 50 to 80, ask your care team if ongoing lung cancer screenings are right for you.  For informational purposes only. Not to replace the advice of your health care provider. Copyright   2023 [employer]. All rights reserved. Clinically reviewed by the [facility]. [employer] 387333 - REV 01/24.

## 2025-01-07 ENCOUNTER — MYC MEDICAL ADVICE (OUTPATIENT)
Dept: FAMILY MEDICINE | Facility: CLINIC | Age: 20
End: 2025-01-07
Payer: COMMERCIAL

## 2025-01-07 DIAGNOSIS — Z23 NEED FOR VACCINATION: Primary | ICD-10-CM

## 2025-01-08 ENCOUNTER — ALLIED HEALTH/NURSE VISIT (OUTPATIENT)
Dept: FAMILY MEDICINE | Facility: CLINIC | Age: 20
End: 2025-01-08
Payer: COMMERCIAL

## 2025-01-08 DIAGNOSIS — Z23 NEED FOR VACCINATION: ICD-10-CM

## 2025-01-08 PROCEDURE — 99207 PR NO CHARGE NURSE ONLY: CPT

## 2025-01-08 PROCEDURE — 90691 TYPHOID VACCINE IM: CPT

## 2025-01-08 PROCEDURE — 90471 IMMUNIZATION ADMIN: CPT

## 2025-01-08 NOTE — TELEPHONE ENCOUNTER
Order was placed incorrectly as part of this encounter and not a standing or future order.    Pt has CSS visit today at 3pm.    Please re-approve pended order.    Thank you.

## 2025-01-08 NOTE — PROGRESS NOTES
Prior to immunization administration, verified patients identity using patient s name and date of birth. Please see Immunization Activity for additional information.     Is the patient's temperature normal (100.5 or less)? Yes     Patient MEETS CRITERIA. PROCEED with vaccine administration.      Patient instructed to remain in clinic for 15 minutes afterwards, and to report any adverse reactions.      Link to Ancillary Visit Immunization Standing Orders SmartSet     Screening performed by Dayanna Neumann RN on 1/8/2025 at 2:56 PM.

## 2025-06-11 DIAGNOSIS — R11.15 NON-INTRACTABLE CYCLICAL VOMITING WITH NAUSEA: ICD-10-CM

## 2025-06-11 RX ORDER — ONDANSETRON 4 MG/1
TABLET, ORALLY DISINTEGRATING ORAL EVERY 8 HOURS PRN
Qty: 20 TABLET | Refills: 0 | Status: SHIPPED | OUTPATIENT
Start: 2025-06-11

## 2025-06-11 NOTE — TELEPHONE ENCOUNTER
Prescription approved per [facility] refill protocol.  Yanely CHOW RN

## 2025-06-16 DIAGNOSIS — R11.15 NON-INTRACTABLE CYCLICAL VOMITING WITH NAUSEA: ICD-10-CM

## 2025-06-16 RX ORDER — ONDANSETRON 4 MG/1
TABLET, ORALLY DISINTEGRATING ORAL
Qty: 20 TABLET | Refills: 0 | OUTPATIENT
Start: 2025-06-16

## 2025-08-26 ENCOUNTER — OFFICE VISIT (OUTPATIENT)
Dept: FAMILY MEDICINE | Facility: CLINIC | Age: 20
End: 2025-08-26
Payer: COMMERCIAL

## 2025-08-26 VITALS
RESPIRATION RATE: 16 BRPM | DIASTOLIC BLOOD PRESSURE: 69 MMHG | SYSTOLIC BLOOD PRESSURE: 103 MMHG | HEIGHT: 66 IN | WEIGHT: 135.6 LBS | HEART RATE: 58 BPM | TEMPERATURE: 97.8 F | BODY MASS INDEX: 21.79 KG/M2 | OXYGEN SATURATION: 96 %

## 2025-08-26 DIAGNOSIS — Z00.00 HEALTH CARE MAINTENANCE: ICD-10-CM

## 2025-08-26 DIAGNOSIS — R11.15 NON-INTRACTABLE CYCLICAL VOMITING WITH NAUSEA: ICD-10-CM

## 2025-08-26 DIAGNOSIS — Z13.0 SCREENING FOR ENDOCRINE, NUTRITIONAL, METABOLIC AND IMMUNITY DISORDER: ICD-10-CM

## 2025-08-26 DIAGNOSIS — Z13.0 SCREENING, ANEMIA, DEFICIENCY, IRON: ICD-10-CM

## 2025-08-26 DIAGNOSIS — Z12.4 CERVICAL CANCER SCREENING: ICD-10-CM

## 2025-08-26 DIAGNOSIS — J45.20 MILD INTERMITTENT ASTHMA WITHOUT COMPLICATION: ICD-10-CM

## 2025-08-26 DIAGNOSIS — J45.990 EXERCISE-INDUCED ASTHMA: ICD-10-CM

## 2025-08-26 DIAGNOSIS — Z13.21 SCREENING FOR ENDOCRINE, NUTRITIONAL, METABOLIC AND IMMUNITY DISORDER: ICD-10-CM

## 2025-08-26 DIAGNOSIS — Z13.220 SCREENING, LIPID: ICD-10-CM

## 2025-08-26 DIAGNOSIS — Z13.29 SCREENING FOR ENDOCRINE, NUTRITIONAL, METABOLIC AND IMMUNITY DISORDER: ICD-10-CM

## 2025-08-26 DIAGNOSIS — Z00.00 ROUTINE GENERAL MEDICAL EXAMINATION AT A HEALTH CARE FACILITY: Primary | ICD-10-CM

## 2025-08-26 DIAGNOSIS — Z30.09 GENERAL COUNSELING FOR PRESCRIPTION OF ORAL CONTRACEPTIVES: ICD-10-CM

## 2025-08-26 DIAGNOSIS — Z13.228 SCREENING FOR METABOLIC DISORDER: ICD-10-CM

## 2025-08-26 DIAGNOSIS — Z13.228 SCREENING FOR ENDOCRINE, NUTRITIONAL, METABOLIC AND IMMUNITY DISORDER: ICD-10-CM

## 2025-08-26 DIAGNOSIS — B07.0 PLANTAR WARTS: ICD-10-CM

## 2025-08-26 LAB
ERYTHROCYTE [DISTWIDTH] IN BLOOD BY AUTOMATED COUNT: 12.9 % (ref 10–15)
HCT VFR BLD AUTO: 41.3 % (ref 35–47)
HGB BLD-MCNC: 13.7 G/DL (ref 11.7–15.7)
MCH RBC QN AUTO: 30.7 PG (ref 26.5–33)
MCHC RBC AUTO-ENTMCNC: 33.2 G/DL (ref 31.5–36.5)
MCV RBC AUTO: 92.6 FL (ref 78–100)
PLATELET # BLD AUTO: 238 10E3/UL (ref 150–450)
RBC # BLD AUTO: 4.46 10E6/UL (ref 3.8–5.2)
WBC # BLD AUTO: 6.48 10E3/UL (ref 4–11)

## 2025-08-26 PROCEDURE — 99214 OFFICE O/P EST MOD 30 MIN: CPT | Mod: 25 | Performed by: FAMILY MEDICINE

## 2025-08-26 PROCEDURE — 99395 PREV VISIT EST AGE 18-39: CPT | Performed by: FAMILY MEDICINE

## 2025-08-26 PROCEDURE — 3074F SYST BP LT 130 MM HG: CPT | Performed by: FAMILY MEDICINE

## 2025-08-26 PROCEDURE — 80053 COMPREHEN METABOLIC PANEL: CPT | Performed by: FAMILY MEDICINE

## 2025-08-26 PROCEDURE — 82306 VITAMIN D 25 HYDROXY: CPT | Performed by: FAMILY MEDICINE

## 2025-08-26 PROCEDURE — 85027 COMPLETE CBC AUTOMATED: CPT | Performed by: FAMILY MEDICINE

## 2025-08-26 PROCEDURE — 80061 LIPID PANEL: CPT | Performed by: FAMILY MEDICINE

## 2025-08-26 PROCEDURE — 36415 COLL VENOUS BLD VENIPUNCTURE: CPT | Performed by: FAMILY MEDICINE

## 2025-08-26 PROCEDURE — 3078F DIAST BP <80 MM HG: CPT | Performed by: FAMILY MEDICINE

## 2025-08-26 RX ORDER — NORGESTIMATE AND ETHINYL ESTRADIOL 0.25-0.035
1 KIT ORAL DAILY
Qty: 84 TABLET | Refills: 0 | Status: SHIPPED | OUTPATIENT
Start: 2025-08-26

## 2025-08-26 RX ORDER — ALBUTEROL SULFATE 90 UG/1
2 INHALANT RESPIRATORY (INHALATION) EVERY 4 HOURS PRN
Qty: 18 G | Refills: 0 | Status: SHIPPED | OUTPATIENT
Start: 2025-08-26

## 2025-08-26 RX ORDER — MONTELUKAST SODIUM 10 MG/1
1 TABLET ORAL AT BEDTIME
Qty: 90 TABLET | Refills: 3 | Status: SHIPPED | OUTPATIENT
Start: 2025-08-26

## 2025-08-26 RX ORDER — ONDANSETRON 4 MG/1
4 TABLET, ORALLY DISINTEGRATING ORAL EVERY 8 HOURS PRN
Qty: 30 TABLET | Refills: 1 | Status: SHIPPED | OUTPATIENT
Start: 2025-08-26

## 2025-08-26 SDOH — HEALTH STABILITY: PHYSICAL HEALTH: ON AVERAGE, HOW MANY DAYS PER WEEK DO YOU ENGAGE IN MODERATE TO STRENUOUS EXERCISE (LIKE A BRISK WALK)?: 5 DAYS

## 2025-08-26 SDOH — HEALTH STABILITY: PHYSICAL HEALTH: ON AVERAGE, HOW MANY MINUTES DO YOU ENGAGE IN EXERCISE AT THIS LEVEL?: 40 MIN

## 2025-08-26 ASSESSMENT — ASTHMA QUESTIONNAIRES
ACT_TOTALSCORE: 23
QUESTION_2 LAST FOUR WEEKS HOW OFTEN HAVE YOU HAD SHORTNESS OF BREATH: ONCE OR TWICE A WEEK
QUESTION_5 LAST FOUR WEEKS HOW WOULD YOU RATE YOUR ASTHMA CONTROL: COMPLETELY CONTROLLED
QUESTION_3 LAST FOUR WEEKS HOW OFTEN DID YOUR ASTHMA SYMPTOMS (WHEEZING, COUGHING, SHORTNESS OF BREATH, CHEST TIGHTNESS OR PAIN) WAKE YOU UP AT NIGHT OR EARLIER THAN USUAL IN THE MORNING: NOT AT ALL
QUESTION_1 LAST FOUR WEEKS HOW MUCH OF THE TIME DID YOUR ASTHMA KEEP YOU FROM GETTING AS MUCH DONE AT WORK, SCHOOL OR AT HOME: NONE OF THE TIME
QUESTION_4 LAST FOUR WEEKS HOW OFTEN HAVE YOU USED YOUR RESCUE INHALER OR NEBULIZER MEDICATION (SUCH AS ALBUTEROL): ONCE A WEEK OR LESS

## 2025-08-27 LAB
ALBUMIN SERPL BCG-MCNC: 4.5 G/DL (ref 3.5–5.2)
ALP SERPL-CCNC: 66 U/L (ref 40–150)
ALT SERPL W P-5'-P-CCNC: 16 U/L (ref 0–50)
ANION GAP SERPL CALCULATED.3IONS-SCNC: 17 MMOL/L (ref 7–15)
AST SERPL W P-5'-P-CCNC: 19 U/L (ref 0–45)
BILIRUB SERPL-MCNC: 0.3 MG/DL
BUN SERPL-MCNC: 15.7 MG/DL (ref 6–20)
CALCIUM SERPL-MCNC: 9.5 MG/DL (ref 8.8–10.4)
CHLORIDE SERPL-SCNC: 100 MMOL/L (ref 98–107)
CHOLEST SERPL-MCNC: 155 MG/DL
CREAT SERPL-MCNC: 0.85 MG/DL (ref 0.51–0.95)
EGFRCR SERPLBLD CKD-EPI 2021: >90 ML/MIN/1.73M2
FASTING STATUS PATIENT QL REPORTED: NO
FASTING STATUS PATIENT QL REPORTED: NO
GLUCOSE SERPL-MCNC: 103 MG/DL (ref 70–99)
HCO3 SERPL-SCNC: 21 MMOL/L (ref 22–29)
HDLC SERPL-MCNC: 50 MG/DL
LDLC SERPL CALC-MCNC: 79 MG/DL
NONHDLC SERPL-MCNC: 105 MG/DL
POTASSIUM SERPL-SCNC: 4.2 MMOL/L (ref 3.4–5.3)
PROT SERPL-MCNC: 7.3 G/DL (ref 6.4–8.3)
SODIUM SERPL-SCNC: 138 MMOL/L (ref 135–145)
TRIGL SERPL-MCNC: 131 MG/DL
VIT D+METAB SERPL-MCNC: 64 NG/ML (ref 20–50)